# Patient Record
Sex: MALE | Race: WHITE | NOT HISPANIC OR LATINO | Employment: OTHER | URBAN - NONMETROPOLITAN AREA
[De-identification: names, ages, dates, MRNs, and addresses within clinical notes are randomized per-mention and may not be internally consistent; named-entity substitution may affect disease eponyms.]

---

## 2022-03-17 ENCOUNTER — OFFICE VISIT (OUTPATIENT)
Dept: CARDIOLOGY | Facility: CLINIC | Age: 72
End: 2022-03-17

## 2022-03-17 VITALS
WEIGHT: 234 LBS | BODY MASS INDEX: 34.66 KG/M2 | SYSTOLIC BLOOD PRESSURE: 124 MMHG | HEART RATE: 78 BPM | OXYGEN SATURATION: 95 % | DIASTOLIC BLOOD PRESSURE: 76 MMHG | HEIGHT: 69 IN

## 2022-03-17 DIAGNOSIS — R60.0 LOWER EXTREMITY EDEMA: ICD-10-CM

## 2022-03-17 DIAGNOSIS — R07.9 CHEST PAIN, UNSPECIFIED TYPE: Primary | ICD-10-CM

## 2022-03-17 DIAGNOSIS — R06.02 SHORTNESS OF BREATH: ICD-10-CM

## 2022-03-17 PROBLEM — R53.83 FATIGUE: Status: ACTIVE | Noted: 2022-03-17

## 2022-03-17 PROBLEM — N52.9 MALE ERECTILE DISORDER: Status: ACTIVE | Noted: 2022-03-17

## 2022-03-17 PROBLEM — L40.9 PSORIASIS: Status: ACTIVE | Noted: 2022-03-17

## 2022-03-17 PROBLEM — R06.00 DYSPNEA: Status: ACTIVE | Noted: 2022-03-17

## 2022-03-17 PROBLEM — J44.9 CHRONIC OBSTRUCTIVE PULMONARY DISEASE: Status: ACTIVE | Noted: 2022-03-17

## 2022-03-17 PROBLEM — E11.9 TYPE 2 DIABETES MELLITUS WITHOUT COMPLICATION: Status: ACTIVE | Noted: 2022-03-17

## 2022-03-17 PROBLEM — R94.5 ABNORMAL RESULTS OF LIVER FUNCTION STUDIES: Status: ACTIVE | Noted: 2022-03-17

## 2022-03-17 PROBLEM — I10 ESSENTIAL HYPERTENSION: Status: ACTIVE | Noted: 2022-03-17

## 2022-03-17 PROBLEM — Z85.46 HISTORY OF MALIGNANT NEOPLASM OF PROSTATE: Status: ACTIVE | Noted: 2022-03-17

## 2022-03-17 PROCEDURE — 93000 ELECTROCARDIOGRAM COMPLETE: CPT | Performed by: PHYSICIAN ASSISTANT

## 2022-03-17 PROCEDURE — 99204 OFFICE O/P NEW MOD 45 MIN: CPT | Performed by: PHYSICIAN ASSISTANT

## 2022-03-17 RX ORDER — ASPIRIN 81 MG/1
81 TABLET ORAL DAILY
Qty: 30 TABLET | Refills: 5 | Status: SHIPPED | OUTPATIENT
Start: 2022-03-17

## 2022-03-17 RX ORDER — RAMIPRIL 10 MG/1
CAPSULE ORAL
COMMUNITY
Start: 2022-03-07 | End: 2022-03-17

## 2022-03-17 RX ORDER — FUROSEMIDE 40 MG/1
40 TABLET ORAL DAILY
COMMUNITY
Start: 2022-01-20

## 2022-03-17 RX ORDER — PREDNISONE 20 MG/1
TABLET ORAL
COMMUNITY
Start: 2022-02-14 | End: 2022-03-17

## 2022-03-17 RX ORDER — POTASSIUM CHLORIDE 1500 MG/1
20 TABLET, FILM COATED, EXTENDED RELEASE ORAL DAILY
COMMUNITY
Start: 2022-03-10

## 2022-03-17 RX ORDER — LEVALBUTEROL INHALATION SOLUTION 1.25 MG/3ML
SOLUTION RESPIRATORY (INHALATION)
COMMUNITY
Start: 2022-02-21 | End: 2022-08-18 | Stop reason: ALTCHOICE

## 2022-03-17 RX ORDER — NITROGLYCERIN 0.4 MG/1
0.4 TABLET SUBLINGUAL
COMMUNITY
Start: 2022-03-10

## 2022-03-17 RX ORDER — EMPAGLIFLOZIN 10 MG/1
10 TABLET, FILM COATED ORAL DAILY
COMMUNITY
Start: 2022-03-10 | End: 2022-08-18 | Stop reason: DRUGHIGH

## 2022-03-17 RX ORDER — CARVEDILOL 6.25 MG/1
6.25 TABLET ORAL 2 TIMES DAILY WITH MEALS
COMMUNITY
Start: 2022-03-10

## 2022-03-17 NOTE — PROGRESS NOTES
Subjective   Shalom Bravo is a 71 y.o. male     Chief Complaint   Patient presents with   • Cardiac Evaluation     New Patient   Problem list  1.  History of nonobstructive coronaries approximately 8 to 10 years ago per patient report, inadequate data  2.  Chest pain  3.  Lower extremity edema    HPI    Patient is a 71-year-old male who presents to the office to be evaluated.    Recently, patient was at home whenever he started feeling some substernal discomfort.  He felt as if it could be indigestion because he had some belching following that discomfort.  Patient got up and sat down and apparently took his blood pressure and it was significantly elevated.  His heart rate was increased.  Eventually his symptoms improved    He went to see his primary and patient had been experiencing lower extremity edema.  Patient was placed on 40 mg of Lasix and describes losing several pounds.  Patient is actually feeling better since going on that medication    Patient does not describe as severe pain is what he felt that night.  He otherwise has done well.  He has mild dyspnea but can perform activities of daily living.  He does not describe PND orthopnea.     He does not describe any palpitations.  Otherwise is doing well    Current Outpatient Medications   Medication Sig Dispense Refill   • carvedilol (COREG) 6.25 MG tablet Take 6.25 mg by mouth 2 (Two) Times a Day With Meals.     • furosemide (LASIX) 40 MG tablet Take 40 mg by mouth Daily.     • Jardiance 10 MG tablet tablet 10 mg Daily.     • levalbuterol (XOPENEX) 1.25 MG/3ML nebulizer solution      • Multiple Vitamins-Minerals (VITEYES AREDS ADVANCED PO) Take  by mouth.     • nitroglycerin (NITROSTAT) 0.4 MG SL tablet Place 0.4 mg under the tongue Every 5 (Five) Minutes As Needed.     • potassium chloride ER (K-TAB) 20 MEQ tablet controlled-release ER tablet Take 20 mEq by mouth Daily.     • aspirin (aspirin) 81 MG EC tablet Take 1 tablet by mouth Daily. 30 tablet 5  "    No current facility-administered medications for this visit.       Latex    Past Medical History:   Diagnosis Date   • COPD (chronic obstructive pulmonary disease) (HCC)    • Diabetes mellitus (HCC)    • Hypertension    • Prostate cancer (HCC)    • Psoriasis        Social History     Socioeconomic History   • Marital status:    Tobacco Use   • Smoking status: Former Smoker   • Smokeless tobacco: Current User     Types: Snuff   Substance and Sexual Activity   • Alcohol use: Yes   • Drug use: Not Currently   • Sexual activity: Defer       Family History   Problem Relation Age of Onset   • Alzheimer's disease Mother    • Leukemia Father    • Scleroderma Sister    • Heart attack Brother        Review of Systems   Constitutional: Negative.  Negative for chills and fever.   HENT: Negative.  Negative for congestion and sinus pressure.    Respiratory: Positive for shortness of breath. Negative for chest tightness.    Cardiovascular: Positive for chest pain and leg swelling (states improved since starting lasix 40 mg ). Negative for palpitations.   Neurological: Negative.  Negative for dizziness, syncope and light-headedness.   Psychiatric/Behavioral: Positive for sleep disturbance (reports woke several night ago with chest pain, went to pcp next day, did not go to er).       Objective   Vitals:    03/17/22 1127   BP: 124/76   BP Location: Left arm   Patient Position: Sitting   Pulse: 78   SpO2: 95%   Weight: 106 kg (234 lb)   Height: 175.3 cm (69\")      /76 (BP Location: Left arm, Patient Position: Sitting)   Pulse 78   Ht 175.3 cm (69\")   Wt 106 kg (234 lb)   SpO2 95%   BMI 34.56 kg/m²     Lab Results (most recent)     None          Physical Exam  Vitals and nursing note reviewed.   Constitutional:       General: He is not in acute distress.     Appearance: Normal appearance. He is well-developed.   HENT:      Head: Normocephalic and atraumatic.   Eyes:      General: No scleral icterus.        Right " eye: No discharge.         Left eye: No discharge.      Conjunctiva/sclera: Conjunctivae normal.   Neck:      Vascular: No carotid bruit.   Cardiovascular:      Rate and Rhythm: Normal rate and regular rhythm.      Heart sounds: Normal heart sounds. No murmur heard.    No friction rub. No gallop.   Pulmonary:      Effort: Pulmonary effort is normal. No respiratory distress.      Breath sounds: Normal breath sounds. No wheezing or rales.   Chest:      Chest wall: No tenderness.   Musculoskeletal:      Right lower leg: No edema.      Left lower leg: No edema.   Skin:     General: Skin is warm and dry.      Coloration: Skin is not pale.      Findings: No erythema or rash.   Neurological:      Mental Status: He is alert and oriented to person, place, and time.      Cranial Nerves: No cranial nerve deficit.   Psychiatric:         Behavior: Behavior normal.         Procedure     ECG 12 Lead    Date/Time: 3/17/2022 11:37 AM  Performed by: Bello Oneill PA  Authorized by: Bello Oneill PA   Comparison: not compared with previous ECG   Previous ECG: no previous ECG available  Comments: EKG demonstrates sinus rhythm at 74 bpm with no acute ST changes                 Assessment/Plan     Problems Addressed this Visit        Symptoms and Signs    Dyspnea    Relevant Orders    Adult Transthoracic Echo Complete W/ Cont if Necessary Per Protocol    Stress Test With Myocardial Perfusion One Day      Other Visit Diagnoses     Chest pain, unspecified type    -  Primary    Relevant Orders    Adult Transthoracic Echo Complete W/ Cont if Necessary Per Protocol    Stress Test With Myocardial Perfusion One Day    Lower extremity edema        Relevant Orders    Adult Transthoracic Echo Complete W/ Cont if Necessary Per Protocol    Stress Test With Myocardial Perfusion One Day      Diagnoses       Codes Comments    Chest pain, unspecified type    -  Primary ICD-10-CM: R07.9  ICD-9-CM: 786.50     Shortness of breath     ICD-10-CM:  R06.02  ICD-9-CM: 786.05     Lower extremity edema     ICD-10-CM: R60.0  ICD-9-CM: 782.3           Recommendation  1.  Patient is a 71-year-old male that complained of significant chest discomfort that apparently has significant lower extremity edema and has had several pounds of weight loss since starting diuretic therapy.  We are concerned about the possibility of congestive failure and ischemic heart disease.  We will like to evaluate.    2.  Stress test we ordered for an ischemia assessment.    3.  Echo to assess LV systolic and diastolic performance, pulmonary pressures etc.    4.  We are starting him on aspirin daily and he has nitroglycerin available for chest pain as needed.  Any chest pain, not resolved with nitroglycerin, recommend ER evaluation    5.  If symptoms were to worsen from now until the day he does his tests, I recommended him calling the office.  Otherwise we will see him back for follow-up on testing.  Follow-up with primary as scheduled           Patient brought in medicine list to appointment, it's been reviewed with patient and med list was updated in the chart.     Advance Care Planning   ACP discussion was held with the patient during this visit. Patient has an advance directive (not in EMR), copy requested.             Electronically signed by:

## 2022-04-27 ENCOUNTER — HOSPITAL ENCOUNTER (OUTPATIENT)
Dept: CARDIOLOGY | Facility: HOSPITAL | Age: 72
Discharge: HOME | End: 2022-04-27

## 2022-04-27 DIAGNOSIS — R60.0 LOWER EXTREMITY EDEMA: ICD-10-CM

## 2022-04-27 DIAGNOSIS — R07.9 CHEST PAIN, UNSPECIFIED TYPE: ICD-10-CM

## 2022-04-27 DIAGNOSIS — R06.02 SHORTNESS OF BREATH: ICD-10-CM

## 2022-04-27 PROCEDURE — A9500 TC99M SESTAMIBI: HCPCS | Performed by: INTERNAL MEDICINE

## 2022-04-27 PROCEDURE — 93017 CV STRESS TEST TRACING ONLY: CPT

## 2022-04-27 PROCEDURE — 25010000002 REGADENOSON 0.4 MG/5ML SOLUTION: Performed by: INTERNAL MEDICINE

## 2022-04-27 PROCEDURE — 93306 TTE W/DOPPLER COMPLETE: CPT

## 2022-04-27 PROCEDURE — 93018 CV STRESS TEST I&R ONLY: CPT | Performed by: INTERNAL MEDICINE

## 2022-04-27 PROCEDURE — 0 TECHNETIUM SESTAMIBI: Performed by: INTERNAL MEDICINE

## 2022-04-27 PROCEDURE — 78452 HT MUSCLE IMAGE SPECT MULT: CPT

## 2022-04-27 PROCEDURE — 93306 TTE W/DOPPLER COMPLETE: CPT | Performed by: INTERNAL MEDICINE

## 2022-04-27 PROCEDURE — 78452 HT MUSCLE IMAGE SPECT MULT: CPT | Performed by: INTERNAL MEDICINE

## 2022-04-27 RX ADMIN — TECHNETIUM TC 99M SESTAMIBI 1 DOSE: 1 INJECTION INTRAVENOUS at 12:20

## 2022-04-27 RX ADMIN — REGADENOSON 0.4 MG: 0.08 INJECTION, SOLUTION INTRAVENOUS at 14:02

## 2022-04-27 RX ADMIN — TECHNETIUM TC 99M SESTAMIBI 1 DOSE: 1 INJECTION INTRAVENOUS at 14:02

## 2022-04-29 LAB
BH CV REST NUCLEAR ISOTOPE DOSE: 10 MCI
BH CV STRESS COMMENTS STAGE 1: NORMAL
BH CV STRESS DOSE REGADENOSON STAGE 1: 0.4
BH CV STRESS DURATION MIN STAGE 1: 0
BH CV STRESS DURATION SEC STAGE 1: 10
BH CV STRESS NUCLEAR ISOTOPE DOSE: 30 MCI
BH CV STRESS PROTOCOL 1: NORMAL
BH CV STRESS RECOVERY BP: NORMAL MMHG
BH CV STRESS RECOVERY HR: 75 BPM
BH CV STRESS STAGE 1: 1
MAXIMAL PREDICTED HEART RATE: 149 BPM
PERCENT MAX PREDICTED HR: 54.36 %
STRESS BASELINE BP: NORMAL MMHG
STRESS BASELINE HR: 65 BPM
STRESS PERCENT HR: 64 %
STRESS POST PEAK BP: NORMAL MMHG
STRESS POST PEAK HR: 81 BPM
STRESS TARGET HR: 127 BPM

## 2022-05-04 ENCOUNTER — TELEPHONE (OUTPATIENT)
Dept: CARDIOLOGY | Facility: CLINIC | Age: 72
End: 2022-05-04

## 2022-05-04 NOTE — TELEPHONE ENCOUNTER
STRESS  Pt notified of no acute findings. Provider will discuss results at f/u. Pt reminded of appt date and time.    ----- Message from SOMMER Mccallum sent at 5/2/2022  1:11 PM EDT -----  Routine follow-up

## 2022-05-08 LAB
BH CV ECHO MEAS - ACS: 2.06 CM
BH CV ECHO MEAS - AO MAX PG: 3.7 MMHG
BH CV ECHO MEAS - AO MEAN PG: 2.18 MMHG
BH CV ECHO MEAS - AO ROOT DIAM: 3.3 CM
BH CV ECHO MEAS - AO V2 MAX: 96.4 CM/SEC
BH CV ECHO MEAS - AO V2 VTI: 20.1 CM
BH CV ECHO MEAS - EDV(CUBED): 103.2 ML
BH CV ECHO MEAS - EDV(MOD-SP4): 86.8 ML
BH CV ECHO MEAS - EF(MOD-SP4): 50.6 %
BH CV ECHO MEAS - ESV(CUBED): 30.4 ML
BH CV ECHO MEAS - ESV(MOD-SP4): 42.9 ML
BH CV ECHO MEAS - FS: 33.5 %
BH CV ECHO MEAS - IVS/LVPW: 0.93 CM
BH CV ECHO MEAS - IVSD: 1.32 CM
BH CV ECHO MEAS - LA DIMENSION: 3.2 CM
BH CV ECHO MEAS - LAT PEAK E' VEL: 7.6 CM/SEC
BH CV ECHO MEAS - LV DIASTOLIC VOL/BSA (35-75): 39.3 CM2
BH CV ECHO MEAS - LV MASS(C)D: 256 GRAMS
BH CV ECHO MEAS - LV SYSTOLIC VOL/BSA (12-30): 19.4 CM2
BH CV ECHO MEAS - LVIDD: 4.7 CM
BH CV ECHO MEAS - LVIDS: 3.1 CM
BH CV ECHO MEAS - LVOT AREA: 4.2 CM2
BH CV ECHO MEAS - LVOT DIAM: 2.32 CM
BH CV ECHO MEAS - LVPWD: 1.42 CM
BH CV ECHO MEAS - MED PEAK E' VEL: 7 CM/SEC
BH CV ECHO MEAS - MV A MAX VEL: 83.1 CM/SEC
BH CV ECHO MEAS - MV DEC SLOPE: 277.7 CM/SEC2
BH CV ECHO MEAS - MV E MAX VEL: 75.8 CM/SEC
BH CV ECHO MEAS - MV E/A: 0.91
BH CV ECHO MEAS - RVDD: 2.43 CM
BH CV ECHO MEAS - SI(MOD-SP4): 19.9 ML/M2
BH CV ECHO MEAS - SV(MOD-SP4): 43.9 ML
BH CV ECHO MEASUREMENTS AVERAGE E/E' RATIO: 10.38
LEFT ATRIUM VOLUME INDEX: 16 ML/M2
MAXIMAL PREDICTED HEART RATE: 149 BPM
STRESS TARGET HR: 127 BPM

## 2022-05-10 ENCOUNTER — TELEPHONE (OUTPATIENT)
Dept: CARDIOLOGY | Facility: CLINIC | Age: 72
End: 2022-05-10

## 2022-05-10 NOTE — TELEPHONE ENCOUNTER
ECHO  Pt notified of no acute findings. Provider will discuss results at f/u. Pt reminded of appt date and time.  ----- Message from SOMMER Mccallum sent at 5/9/2022  8:54 AM EDT -----  Routine follow-up

## 2022-08-18 ENCOUNTER — OFFICE VISIT (OUTPATIENT)
Dept: CARDIOLOGY | Facility: CLINIC | Age: 72
End: 2022-08-18

## 2022-08-18 VITALS
WEIGHT: 231 LBS | OXYGEN SATURATION: 98 % | HEIGHT: 69 IN | HEART RATE: 76 BPM | DIASTOLIC BLOOD PRESSURE: 76 MMHG | BODY MASS INDEX: 34.21 KG/M2 | SYSTOLIC BLOOD PRESSURE: 150 MMHG

## 2022-08-18 DIAGNOSIS — I10 PRIMARY HYPERTENSION: ICD-10-CM

## 2022-08-18 DIAGNOSIS — R60.0 LOWER EXTREMITY EDEMA: Primary | ICD-10-CM

## 2022-08-18 DIAGNOSIS — R06.02 SHORTNESS OF BREATH: ICD-10-CM

## 2022-08-18 PROCEDURE — 99213 OFFICE O/P EST LOW 20 MIN: CPT | Performed by: PHYSICIAN ASSISTANT

## 2022-08-18 RX ORDER — RAMIPRIL 10 MG/1
10 CAPSULE ORAL DAILY
COMMUNITY

## 2022-08-18 RX ORDER — ALBUTEROL SULFATE 90 UG/1
2 AEROSOL, METERED RESPIRATORY (INHALATION) EVERY 4 HOURS PRN
COMMUNITY

## 2022-08-18 NOTE — PROGRESS NOTES
Problem list     Subjective   Shalom Bravo is a 71 y.o. male     Chief Complaint   Patient presents with   • Follow-up   • Hypertension   Problem list  1.  History of nonobstructive coronaries approximately 8 to 10 years ago per patient report, inadequate data  1.1 stress test April 2021 demonstrating no evidence of ischemia preserved LV function  2.  Chest pain  3.  Lower extremity edema       HPI    Patient is a 71-year-old male who presents to the office for evaluation.    Patient has done remarkably well.  Since his last visit, he has no chest pain or pressure.  He can experience mild dyspnea but that is chronic.  He does not describe progressive dyspnea.  He does not describe PND orthopnea.    He occasionally may palpitate.  He does not describe this happening daily.  This happens on occasion.  He does not describe any dizziness, presyncope or syncope.  He does not describe any symptoms of cerebral embolic events.      In the past, Lasix was discontinued and he started having significant edema but back on Lasix therapy, he is doing much better.  He is feeling well    We reviewed testing.  Echo suggested good LV function with mild diastolic dysfunction.  No significant valve disease.  No effusion        Current Outpatient Medications on File Prior to Visit   Medication Sig Dispense Refill   • albuterol sulfate  (90 Base) MCG/ACT inhaler Inhale 2 puffs Every 4 (Four) Hours As Needed for Wheezing.     • aspirin (aspirin) 81 MG EC tablet Take 1 tablet by mouth Daily. 30 tablet 5   • carvedilol (COREG) 6.25 MG tablet Take 6.25 mg by mouth 2 (Two) Times a Day With Meals.     • furosemide (LASIX) 40 MG tablet Take 40 mg by mouth Daily.     • Multiple Vitamins-Minerals (VITEYES AREDS ADVANCED PO) Take  by mouth.     • nitroglycerin (NITROSTAT) 0.4 MG SL tablet Place 0.4 mg under the tongue Every 5 (Five) Minutes As Needed.     • potassium chloride ER (K-TAB) 20 MEQ tablet controlled-release ER tablet Take 20  "mEq by mouth Daily.     • ramipril (ALTACE) 10 MG capsule Take 10 mg by mouth Daily.     • [DISCONTINUED] Jardiance 10 MG tablet tablet 10 mg Daily.     • [DISCONTINUED] levalbuterol (XOPENEX) 1.25 MG/3ML nebulizer solution        No current facility-administered medications on file prior to visit.       Latex    Past Medical History:   Diagnosis Date   • COPD (chronic obstructive pulmonary disease) (HCC)    • Diabetes mellitus (HCC)    • Hypertension    • Prostate cancer (HCC)    • Psoriasis        Social History     Socioeconomic History   • Marital status:    Tobacco Use   • Smoking status: Former Smoker   • Smokeless tobacco: Current User     Types: Snuff   Substance and Sexual Activity   • Alcohol use: Yes   • Drug use: Not Currently   • Sexual activity: Defer       Family History   Problem Relation Age of Onset   • Alzheimer's disease Mother    • Leukemia Father    • Scleroderma Sister    • Heart attack Brother        Review of Systems   Constitutional: Negative.  Negative for chills and fever.   HENT: Negative.  Negative for congestion and sinus pressure.    Respiratory: Positive for shortness of breath. Negative for chest tightness.    Cardiovascular: Positive for palpitations (occasionally races). Negative for chest pain and leg swelling.   Gastrointestinal: Negative.  Negative for abdominal pain, blood in stool, constipation, diarrhea, nausea and vomiting.   Endocrine: Negative.  Negative for cold intolerance and heat intolerance.   Genitourinary: Negative.  Negative for dysuria, frequency, hematuria and urgency.   Neurological: Negative.  Negative for dizziness, syncope and light-headedness.   Psychiatric/Behavioral: Negative.  Negative for sleep disturbance (denies waking with soa or cp).       Objective   Vitals:    08/18/22 1109   BP: 150/76   BP Location: Left arm   Patient Position: Sitting   Pulse: 76   SpO2: 98%   Weight: 105 kg (231 lb)   Height: 175.3 cm (69\")      /76 (BP Location: " "Left arm, Patient Position: Sitting)   Pulse 76   Ht 175.3 cm (69\")   Wt 105 kg (231 lb)   SpO2 98%   BMI 34.11 kg/m²     Lab Results (most recent)     None          Physical Exam  Vitals and nursing note reviewed.   Constitutional:       General: He is not in acute distress.     Appearance: Normal appearance. He is well-developed.   HENT:      Head: Normocephalic and atraumatic.   Eyes:      General: No scleral icterus.        Right eye: No discharge.         Left eye: No discharge.      Conjunctiva/sclera: Conjunctivae normal.   Neck:      Vascular: No carotid bruit.   Cardiovascular:      Rate and Rhythm: Normal rate and regular rhythm.      Heart sounds: Normal heart sounds. No murmur heard.    No friction rub. No gallop.   Pulmonary:      Effort: Pulmonary effort is normal. No respiratory distress.      Breath sounds: Normal breath sounds. No wheezing or rales.   Chest:      Chest wall: No tenderness.   Musculoskeletal:      Right lower leg: No edema.      Left lower leg: No edema.   Skin:     General: Skin is warm and dry.      Coloration: Skin is not pale.      Findings: No erythema or rash.   Neurological:      Mental Status: He is alert and oriented to person, place, and time.      Cranial Nerves: No cranial nerve deficit.   Psychiatric:         Behavior: Behavior normal.         Procedure   Procedures       Assessment & Plan     Problems Addressed this Visit        Cardiac and Vasculature    Primary hypertension    Relevant Medications    ramipril (ALTACE) 10 MG capsule       Symptoms and Signs    Dyspnea    Lower extremity edema - Primary      Diagnoses       Codes Comments    Lower extremity edema    -  Primary ICD-10-CM: R60.0  ICD-9-CM: 782.3     Shortness of breath     ICD-10-CM: R06.02  ICD-9-CM: 786.05     Primary hypertension     ICD-10-CM: I10  ICD-9-CM: 401.9         Recommendation  1.  Patient is a 71-year-old male who presents back for follow-up doing remarkably well.  He has no chest " pain.  Dyspnea is mild at this point.  He has no ischemia by stress testing and preserved LV function.  For now, we will monitor    2.  Patient with lower extremity edema doing well on Lasix therapy.  He may have a degree of venous insufficiency.  For now, we will monitor    3.  Patient with baseline hypertension slightly elevated.  He may have a degree of whitecoat hypertension.  It was better last visit.  We will monitor for now    4.  We can see patient back for follow-up in a year or sooner as symptoms discussed.  Follow-up with primary as scheduled             Shalom Bravo  reports that he has quit smoking. His smokeless tobacco use includes snuff.. I have educated him on the risk of diseases from using tobacco products such as cancer, COPD and heart disease.     I advised him to quit and he is not willing to quit.    I spent 3  minutes counseling the patient.          Patient did not bring med list or medicine bottles to appointment, med list has been reviewed and updated based on patient's knowledge of their meds.     Advance Care Planning   ACP discussion was held with the patient during this visit. Patient has an advance directive (not in EMR), copy requested.            Electronically signed by:

## 2023-01-04 ENCOUNTER — TELEPHONE (OUTPATIENT)
Dept: CARDIOLOGY | Facility: CLINIC | Age: 73
End: 2023-01-04
Payer: MEDICARE

## 2023-01-04 NOTE — TELEPHONE ENCOUNTER
Caller: MARCELLO    Relationship: DR. [facility] OFFICE    What form or medical record are you requesting: MOST RECENT CONSULT NOTE    How would you like to receive the form or medical records (pick-up, mail, fax): FAX  If fax, what is the fax number: [phone]    Timeframe paperwork needed: ASAP

## 2023-08-14 ENCOUNTER — TELEPHONE (OUTPATIENT)
Dept: CARDIOLOGY | Facility: CLINIC | Age: 73
End: 2023-08-14

## 2023-08-14 NOTE — TELEPHONE ENCOUNTER
Caller: Shalom Bravo    Relationship to patient: Self    Best call back number: [phone]    Chief complaint: HAD TO RESCHEDULE APPT.    Type of visit: YEARLY FU    Requested date: MONTH OR TWO OUT     If rescheduling, when is the original appointment: 08.21.23     Additional notes: DAVID HAD NOTHING TILL MARCH 2024

## 2023-11-27 ENCOUNTER — OFFICE VISIT (OUTPATIENT)
Dept: CARDIOLOGY | Facility: CLINIC | Age: 73
End: 2023-11-27
Payer: MEDICARE

## 2023-11-27 VITALS
WEIGHT: 236 LBS | HEART RATE: 101 BPM | DIASTOLIC BLOOD PRESSURE: 78 MMHG | BODY MASS INDEX: 34.85 KG/M2 | OXYGEN SATURATION: 96 % | SYSTOLIC BLOOD PRESSURE: 152 MMHG

## 2023-11-27 DIAGNOSIS — R06.02 SHORTNESS OF BREATH: ICD-10-CM

## 2023-11-27 DIAGNOSIS — R60.0 LOWER EXTREMITY EDEMA: Primary | ICD-10-CM

## 2023-11-27 DIAGNOSIS — I10 PRIMARY HYPERTENSION: ICD-10-CM

## 2023-11-27 PROBLEM — R07.9 CHEST PAIN: Status: ACTIVE | Noted: 2023-11-27

## 2023-11-27 PROCEDURE — 3077F SYST BP >= 140 MM HG: CPT | Performed by: PHYSICIAN ASSISTANT

## 2023-11-27 PROCEDURE — 99213 OFFICE O/P EST LOW 20 MIN: CPT | Performed by: PHYSICIAN ASSISTANT

## 2023-11-27 PROCEDURE — 3078F DIAST BP <80 MM HG: CPT | Performed by: PHYSICIAN ASSISTANT

## 2023-11-27 NOTE — PROGRESS NOTES
Problem list     Subjective   Shalom Bravo is a 72 y.o. male     Chief Complaint   Patient presents with    Follow-up     YEARLY     Problem list  1.  History of nonobstructive coronaries approximately 8 to 10 years ago per patient report, inadequate data  1.1 stress test April 2022 demonstrating no evidence of ischemia preserved LV function  2.  Chest pain  3.  Lower extremity edema  HPI    Patient is a 72-year-old male brought to the office for routine cardiac follow-up.  He has underwent systolic function.    He has done well without any complaints of chest pain or pressure.  He apparently has a degree of dyspnea but that has been relatively stable.  Does not describe progressive shortness of breath.  No complaints of PND or orthopnea.  He has chronic lower extremity edema and is on chronic Lasix therapy.    He does not describe palpitating nor does he complain of any dysrhythmic symptoms.  He is stable otherwise.      Current Outpatient Medications on File Prior to Visit   Medication Sig Dispense Refill    albuterol sulfate  (90 Base) MCG/ACT inhaler Inhale 2 puffs Every 4 (Four) Hours As Needed for Wheezing.      aspirin (aspirin) 81 MG EC tablet Take 1 tablet by mouth Daily. 30 tablet 5    carvedilol (COREG) 6.25 MG tablet Take 1 tablet by mouth 2 (Two) Times a Day With Meals.      furosemide (LASIX) 40 MG tablet Take 1 tablet by mouth Daily.      Multiple Vitamins-Minerals (VITEYES AREDS ADVANCED PO) Take  by mouth.      nitroglycerin (NITROSTAT) 0.4 MG SL tablet Place 1 tablet under the tongue Every 5 (Five) Minutes As Needed.      potassium chloride ER (K-TAB) 20 MEQ tablet controlled-release ER tablet Take 1 tablet by mouth Daily.      ramipril (ALTACE) 10 MG capsule Take 1 capsule by mouth Daily.       No current facility-administered medications on file prior to visit.       Latex    Past Medical History:   Diagnosis Date    COPD (chronic obstructive pulmonary disease)     Diabetes mellitus      Hypertension     Prostate cancer     Psoriasis        Social History     Socioeconomic History    Marital status:    Tobacco Use    Smoking status: Former    Smokeless tobacco: Current     Types: Snuff   Substance and Sexual Activity    Alcohol use: Yes    Drug use: Not Currently    Sexual activity: Defer       Family History   Problem Relation Age of Onset    Alzheimer's disease Mother     Leukemia Father     Scleroderma Sister     Heart attack Brother        Review of Systems   Constitutional: Negative.    HENT: Negative.     Eyes: Negative.    Respiratory:  Positive for shortness of breath and wheezing. Negative for apnea, cough and chest tightness.    Cardiovascular: Negative.  Negative for chest pain, palpitations and leg swelling.   Gastrointestinal:  Positive for blood in stool.   Endocrine: Negative.    Genitourinary: Negative.  Negative for hematuria.   Skin: Negative.  Negative for color change, rash and wound.   Allergic/Immunologic: Negative.    Neurological:  Negative for dizziness, light-headedness and headaches.   Hematological:  Bruises/bleeds easily (BRUISES).   Psychiatric/Behavioral:  Negative for sleep disturbance.        Objective   Vitals:    11/27/23 0850   BP: 152/78   Pulse: 101   SpO2: 96%   Weight: 107 kg (236 lb)      /78   Pulse 101   Wt 107 kg (236 lb)   SpO2 96%   BMI 34.85 kg/m²     Lab Results (most recent)       None            Physical Exam  Vitals and nursing note reviewed.   Constitutional:       General: He is not in acute distress.     Appearance: Normal appearance. He is well-developed.   HENT:      Head: Normocephalic and atraumatic.   Eyes:      General: No scleral icterus.        Right eye: No discharge.         Left eye: No discharge.      Conjunctiva/sclera: Conjunctivae normal.   Neck:      Vascular: No carotid bruit.   Cardiovascular:      Rate and Rhythm: Normal rate and regular rhythm.      Heart sounds: Normal heart sounds. No murmur heard.     No  friction rub. No gallop.   Pulmonary:      Effort: Pulmonary effort is normal. No respiratory distress.      Breath sounds: Normal breath sounds. No wheezing or rales.   Chest:      Chest wall: No tenderness.   Skin:     General: Skin is warm and dry.      Coloration: Skin is not pale.      Findings: No erythema or rash.   Neurological:      Mental Status: He is alert and oriented to person, place, and time.      Cranial Nerves: No cranial nerve deficit.   Psychiatric:         Behavior: Behavior normal.         Procedure   Procedures       Assessment & Plan     Problems Addressed this Visit          Cardiac and Vasculature    Primary hypertension       Symptoms and Signs    Dyspnea    Lower extremity edema - Primary     Diagnoses         Codes Comments    Lower extremity edema    -  Primary ICD-10-CM: R60.0  ICD-9-CM: 782.3     Primary hypertension     ICD-10-CM: I10  ICD-9-CM: 401.9     Shortness of breath     ICD-10-CM: R06.02  ICD-9-CM: 786.05           Recommendation  1.  Patient is a 72-year-old male who presents back to the office for routine assessment.  Patient has felt well with a degree of dyspnea at baseline but he is seeing pulmonology tomorrow.  WE will await their findings.  Otherwise, he is stable with recent testing being largely benign from a cardiac standpoint.  We will monitor for now.    2.  Patient with lower extremity edema that is manageable.  Patient on chronic diuretic therapy we will monitor.    3.  Patient with baseline hypertension.  His blood pressure is manageable at this point.  It is apparently much better at home.  We will monitor for now.    4.  We will see patient back for follow-up in 1 year or sooner as discussed.  Follow-up with primary as scheduled.           Patient did not bring med list or medicine bottles to appointment, med list has been reviewed and updated based on patient's knowledge of their meds.      Advance Care Planning   ACP discussion was declined by the patient.  Patient has an advance directive (not in EMR), copy requested.      Electronically signed by:

## 2024-11-27 ENCOUNTER — OFFICE VISIT (OUTPATIENT)
Dept: CARDIOLOGY | Facility: CLINIC | Age: 74
End: 2024-11-27
Payer: MEDICARE

## 2024-11-27 VITALS
OXYGEN SATURATION: 91 % | WEIGHT: 247 LBS | HEART RATE: 89 BPM | DIASTOLIC BLOOD PRESSURE: 85 MMHG | HEIGHT: 70 IN | SYSTOLIC BLOOD PRESSURE: 142 MMHG | BODY MASS INDEX: 35.36 KG/M2

## 2024-11-27 DIAGNOSIS — R06.02 SHORTNESS OF BREATH: ICD-10-CM

## 2024-11-27 DIAGNOSIS — I10 PRIMARY HYPERTENSION: ICD-10-CM

## 2024-11-27 DIAGNOSIS — R60.0 LOWER EXTREMITY EDEMA: Primary | ICD-10-CM

## 2024-11-27 PROCEDURE — 93000 ELECTROCARDIOGRAM COMPLETE: CPT | Performed by: PHYSICIAN ASSISTANT

## 2024-11-27 PROCEDURE — 3079F DIAST BP 80-89 MM HG: CPT | Performed by: PHYSICIAN ASSISTANT

## 2024-11-27 PROCEDURE — 99214 OFFICE O/P EST MOD 30 MIN: CPT | Performed by: PHYSICIAN ASSISTANT

## 2024-11-27 PROCEDURE — 3077F SYST BP >= 140 MM HG: CPT | Performed by: PHYSICIAN ASSISTANT

## 2024-11-27 RX ORDER — GUSELKUMAB 100 MG/ML
INJECTION SUBCUTANEOUS
COMMUNITY

## 2024-11-27 NOTE — PROGRESS NOTES
Problem list     Subjective   Shalom Bravo is a 73 y.o. male     Chief Complaint   Patient presents with    Follow-up    Chest Pain     At night    Shortness of Breath   Problem list  1.  History of nonobstructive coronaries approximately 8 to 10 years ago per patient report, inadequate data  1.1 stress test April 2022 demonstrating no evidence of ischemia preserved LV function  2.  Chest pain  3.  Lower extremity edema  4.  Diastolic dysfunction  5.  Pulmonary fibrosis    HPI    Patient is a 73-year-old male presenting back to the office for routine cardiac assessment yearly evaluation.  He has history of nonobstructive coronaries in the past.  He has history of negative testing in 2022 in regards to ischemia from stress test and normal systolic function but diastolic dysfunction noted.    Patient has underlying lung disease and complains of dyspnea when trying to exert but has complained of orthopnea.  This has been ongoing for several weeks if not a few months.  He has had significant edema but has been on Lasix therapy with refractory edema.  He does not experience any chest pain or pressure.    He occasionally mycins a flutter type sensation.  He does not describe any dizziness, presyncope or syncope.  He does not describe any strokelike symptoms.  Overall, patient has done well.      Current Outpatient Medications on File Prior to Visit   Medication Sig Dispense Refill    albuterol sulfate  (90 Base) MCG/ACT inhaler Inhale 2 puffs Every 4 (Four) Hours As Needed for Wheezing.      carvedilol (COREG) 3.125 MG tablet Take 1 tablet by mouth 2 (Two) Times a Day With Meals.      furosemide (LASIX) 40 MG tablet Take 1 tablet by mouth Daily.      Guselkumab (Tremfya) 100 MG/ML solution auto-injector       metFORMIN (GLUCOPHAGE) 500 MG tablet 1 tablet 2 (Two) Times a Day With Meals.      Multiple Vitamins-Minerals (VITEYES AREDS ADVANCED PO) Take  by mouth.      nitroglycerin (NITROSTAT) 0.4 MG SL tablet  Place 1 tablet under the tongue Every 5 (Five) Minutes As Needed.      potassium chloride ER (K-TAB) 20 MEQ tablet controlled-release ER tablet Take 1 tablet by mouth Daily.      [DISCONTINUED] ramipril (ALTACE) 10 MG capsule Take 1 capsule by mouth Daily.      [DISCONTINUED] aspirin (aspirin) 81 MG EC tablet Take 1 tablet by mouth Daily. 30 tablet 5     No current facility-administered medications on file prior to visit.       Latex    Past Medical History:   Diagnosis Date    COPD (chronic obstructive pulmonary disease)     Diabetes mellitus     Hypertension     Prostate cancer     Psoriasis        Social History     Socioeconomic History    Marital status:    Tobacco Use    Smoking status: Former    Smokeless tobacco: Current     Types: Snuff   Substance and Sexual Activity    Alcohol use: Yes    Drug use: Not Currently    Sexual activity: Defer       Family History   Problem Relation Age of Onset    Alzheimer's disease Mother     Leukemia Father     Scleroderma Sister     Heart attack Brother        Review of Systems   Constitutional:  Negative for activity change, appetite change, chills, fatigue and fever.   HENT: Negative.  Negative for congestion, sinus pressure and sinus pain.    Eyes: Negative.  Negative for visual disturbance.   Respiratory:  Positive for shortness of breath. Negative for apnea.    Cardiovascular:  Negative for chest pain, palpitations and leg swelling.   Gastrointestinal: Negative.  Negative for blood in stool.   Endocrine: Negative.  Negative for cold intolerance and heat intolerance.   Genitourinary: Negative.  Negative for hematuria.   Musculoskeletal: Negative.  Negative for gait problem.   Skin: Negative.  Negative for color change, rash and wound.   Allergic/Immunologic: Negative.  Negative for environmental allergies and food allergies.   Neurological:  Negative for dizziness, syncope, weakness, light-headedness, numbness and headaches.   Hematological: Negative.  Does not  "bruise/bleed easily.   Psychiatric/Behavioral: Negative.  Negative for sleep disturbance.        Objective   Vitals:    11/27/24 0856   BP: 142/85   BP Location: Right arm   Patient Position: Sitting   Cuff Size: Adult   Pulse: 89   SpO2: 91%   Weight: 112 kg (247 lb)   Height: 177.8 cm (70\")      /85 (BP Location: Right arm, Patient Position: Sitting, Cuff Size: Adult)   Pulse 89   Ht 177.8 cm (70\")   Wt 112 kg (247 lb)   SpO2 91%   BMI 35.44 kg/m²     Lab Results (most recent)       None            Physical Exam  Vitals and nursing note reviewed.   Constitutional:       General: He is not in acute distress.     Appearance: Normal appearance. He is well-developed.   HENT:      Head: Normocephalic and atraumatic.   Eyes:      General: No scleral icterus.        Right eye: No discharge.         Left eye: No discharge.      Conjunctiva/sclera: Conjunctivae normal.   Neck:      Vascular: No carotid bruit.   Cardiovascular:      Rate and Rhythm: Normal rate and regular rhythm.      Heart sounds: Normal heart sounds. No murmur heard.     No friction rub. No gallop.   Pulmonary:      Effort: Pulmonary effort is normal. No respiratory distress.      Breath sounds: Normal breath sounds. No wheezing or rales.   Chest:      Chest wall: No tenderness.   Musculoskeletal:      Right lower leg: Edema present.      Left lower leg: Edema present.   Skin:     General: Skin is warm and dry.      Coloration: Skin is not pale.      Findings: No erythema or rash.   Neurological:      Mental Status: He is alert and oriented to person, place, and time.      Cranial Nerves: No cranial nerve deficit.   Psychiatric:         Behavior: Behavior normal.         Procedure     ECG 12 Lead    Date/Time: 11/27/2024 8:57 AM  Performed by: Bello Oneill PA    Authorized by: Bello Oneill PA  Comparison: compared with previous ECG from 3/17/2022  Comparison to previous ECG: EKG demonstrates sinus rhythm at 84 bpm with right bundle " branch block and no acute ST changes             Assessment & Plan     Problems Addressed this Visit          Cardiac and Vasculature    Primary hypertension    Relevant Orders    Adult Transthoracic Echo Complete W/ Cont if Necessary Per Protocol       Symptoms and Signs    Dyspnea    Relevant Orders    Adult Transthoracic Echo Complete W/ Cont if Necessary Per Protocol    Lower extremity edema - Primary    Relevant Orders    Adult Transthoracic Echo Complete W/ Cont if Necessary Per Protocol     Diagnoses         Codes Comments    Lower extremity edema    -  Primary ICD-10-CM: R60.0  ICD-9-CM: 782.3     Primary hypertension     ICD-10-CM: I10  ICD-9-CM: 401.9     Shortness of breath     ICD-10-CM: R06.02  ICD-9-CM: 786.05           Recommendations  1.  Patient is a 73-year-old male with history of diastolic dysfunction and has significant lower extremity edema and complains of orthopnea.  He does have underlying lung disease but there is some concern that he is having potential heart failure.  I am concerned that he potentially has a degree of diastolic heart failure and is on moderate dose of Lasix with continued edema.  I want to repeat an echocardiogram to evaluate.  We want to assess LV systolic and diastolic performance as well as filling pressures and pulmonary pressures.    2.  Empirically, I would like to stop ramipril and transition to Entresto.  I want him to call back in 1 week to see if he has any improvement of symptoms.  If in fact that is the case, more convinced that this is diastolic heart failure.  We want to attempt to try this medication to see if he has clinical improvement.    3.  Patient's blood pressure slightly elevated but we are making adjustments to his regimen.  We will evaluate response.    4.  He is to hold ramipril today.  He has not taken yet and was instructed to take Entresto tomorrow.    5.  We will see him back for follow-up after echo.  He is not interested in stress testing  as discussed with him today.  If symptoms worsen, want him to call the office.  Follow-up with primary as scheduled.         Patient did not bring med list or medicine bottles to appointment, med list has been reviewed and updated based on patient's knowledge of their meds.      Advance Care Planning   ACP discussion was declined by the patient. Patient has an advance directive (not in EMR), copy requested.      Electronically signed by:

## 2024-11-27 NOTE — LETTER
November 27, 2024     Giacomo Hays PA-C  [address] [place] [address] [place] [address]    Patient: Shalom Bravo   YOB: 1950   Date of Visit: 11/27/2024       Dear Giacomo Hays PA-C    Shalom Bravo was in my office today. Below is a copy of my note.    If you have questions, please do not hesitate to call me. I look forward to following Shalom along with you.         Sincerely,        SOMMER You        CC: No Recipients    Problem list     Subjective  Shalom Bravo is a 73 y.o. male     Chief Complaint   Patient presents with   • Follow-up   • Chest Pain     At night   • Shortness of Breath   Problem list  1.  History of nonobstructive coronaries approximately 8 to 10 years ago per patient report, inadequate data  1.1 stress test April 2022 demonstrating no evidence of ischemia preserved LV function  2.  Chest pain  3.  Lower extremity edema  4.  Diastolic dysfunction  5.  Pulmonary fibrosis    HPI    Patient is a 73-year-old male presenting back to the office for routine cardiac assessment yearly evaluation.  He has history of nonobstructive coronaries in the past.  He has history of negative testing in 2022 in regards to ischemia from stress test and normal systolic function but diastolic dysfunction noted.    Patient has underlying lung disease and complains of dyspnea when trying to exert but has complained of orthopnea.  This has been ongoing for several weeks if not a few months.  He has had significant edema but has been on Lasix therapy with refractory edema.  He does not experience any chest pain or pressure.    He occasionally mycins a flutter type sensation.  He does not describe any dizziness, presyncope or syncope.  He does not describe any strokelike symptoms.  Overall, patient has done well.      Current Outpatient Medications on File Prior to Visit   Medication Sig Dispense Refill   • albuterol sulfate  (90 Base) MCG/ACT inhaler Inhale 2 puffs Every 4 (Four) Hours As  Needed for Wheezing.     • carvedilol (COREG) 3.125 MG tablet Take 1 tablet by mouth 2 (Two) Times a Day With Meals.     • furosemide (LASIX) 40 MG tablet Take 1 tablet by mouth Daily.     • Guselkumab (Tremfya) 100 MG/ML solution auto-injector      • metFORMIN (GLUCOPHAGE) 500 MG tablet 1 tablet 2 (Two) Times a Day With Meals.     • Multiple Vitamins-Minerals (VITEYES AREDS ADVANCED PO) Take  by mouth.     • nitroglycerin (NITROSTAT) 0.4 MG SL tablet Place 1 tablet under the tongue Every 5 (Five) Minutes As Needed.     • potassium chloride ER (K-TAB) 20 MEQ tablet controlled-release ER tablet Take 1 tablet by mouth Daily.     • [DISCONTINUED] ramipril (ALTACE) 10 MG capsule Take 1 capsule by mouth Daily.     • [DISCONTINUED] aspirin (aspirin) 81 MG EC tablet Take 1 tablet by mouth Daily. 30 tablet 5     No current facility-administered medications on file prior to visit.       Latex    Past Medical History:   Diagnosis Date   • COPD (chronic obstructive pulmonary disease)    • Diabetes mellitus    • Hypertension    • Prostate cancer    • Psoriasis        Social History     Socioeconomic History   • Marital status:    Tobacco Use   • Smoking status: Former   • Smokeless tobacco: Current     Types: Snuff   Substance and Sexual Activity   • Alcohol use: Yes   • Drug use: Not Currently   • Sexual activity: Defer       Family History   Problem Relation Age of Onset   • Alzheimer's disease Mother    • Leukemia Father    • Scleroderma Sister    • Heart attack Brother        Review of Systems   Constitutional:  Negative for activity change, appetite change, chills, fatigue and fever.   HENT: Negative.  Negative for congestion, sinus pressure and sinus pain.    Eyes: Negative.  Negative for visual disturbance.   Respiratory:  Positive for shortness of breath. Negative for apnea.    Cardiovascular:  Negative for chest pain, palpitations and leg swelling.   Gastrointestinal: Negative.  Negative for blood in stool.  "  Endocrine: Negative.  Negative for cold intolerance and heat intolerance.   Genitourinary: Negative.  Negative for hematuria.   Musculoskeletal: Negative.  Negative for gait problem.   Skin: Negative.  Negative for color change, rash and wound.   Allergic/Immunologic: Negative.  Negative for environmental allergies and food allergies.   Neurological:  Negative for dizziness, syncope, weakness, light-headedness, numbness and headaches.   Hematological: Negative.  Does not bruise/bleed easily.   Psychiatric/Behavioral: Negative.  Negative for sleep disturbance.        Objective  Vitals:    11/27/24 0856   BP: 142/85   BP Location: Right arm   Patient Position: Sitting   Cuff Size: Adult   Pulse: 89   SpO2: 91%   Weight: 112 kg (247 lb)   Height: 177.8 cm (70\")      /85 (BP Location: Right arm, Patient Position: Sitting, Cuff Size: Adult)   Pulse 89   Ht 177.8 cm (70\")   Wt 112 kg (247 lb)   SpO2 91%   BMI 35.44 kg/m²     Lab Results (most recent)       None            Physical Exam  Vitals and nursing note reviewed.   Constitutional:       General: He is not in acute distress.     Appearance: Normal appearance. He is well-developed.   HENT:      Head: Normocephalic and atraumatic.   Eyes:      General: No scleral icterus.        Right eye: No discharge.         Left eye: No discharge.      Conjunctiva/sclera: Conjunctivae normal.   Neck:      Vascular: No carotid bruit.   Cardiovascular:      Rate and Rhythm: Normal rate and regular rhythm.      Heart sounds: Normal heart sounds. No murmur heard.     No friction rub. No gallop.   Pulmonary:      Effort: Pulmonary effort is normal. No respiratory distress.      Breath sounds: Normal breath sounds. No wheezing or rales.   Chest:      Chest wall: No tenderness.   Musculoskeletal:      Right lower leg: Edema present.      Left lower leg: Edema present.   Skin:     General: Skin is warm and dry.      Coloration: Skin is not pale.      Findings: No erythema or " rash.   Neurological:      Mental Status: He is alert and oriented to person, place, and time.      Cranial Nerves: No cranial nerve deficit.   Psychiatric:         Behavior: Behavior normal.         Procedure    ECG 12 Lead    Date/Time: 11/27/2024 8:57 AM  Performed by: Bello Oneill PA    Authorized by: Bello Oneill PA  Comparison: compared with previous ECG from 3/17/2022  Comparison to previous ECG: EKG demonstrates sinus rhythm at 84 bpm with right bundle branch block and no acute ST changes             Assessment & Plan    Problems Addressed this Visit          Cardiac and Vasculature    Primary hypertension    Relevant Orders    Adult Transthoracic Echo Complete W/ Cont if Necessary Per Protocol       Symptoms and Signs    Dyspnea    Relevant Orders    Adult Transthoracic Echo Complete W/ Cont if Necessary Per Protocol    Lower extremity edema - Primary    Relevant Orders    Adult Transthoracic Echo Complete W/ Cont if Necessary Per Protocol     Diagnoses         Codes Comments    Lower extremity edema    -  Primary ICD-10-CM: R60.0  ICD-9-CM: 782.3     Primary hypertension     ICD-10-CM: I10  ICD-9-CM: 401.9     Shortness of breath     ICD-10-CM: R06.02  ICD-9-CM: 786.05           Recommendations  1.  Patient is a 73-year-old male with history of diastolic dysfunction and has significant lower extremity edema and complains of orthopnea.  He does have underlying lung disease but there is some concern that he is having potential heart failure.  I am concerned that he potentially has a degree of diastolic heart failure and is on moderate dose of Lasix with continued edema.  I want to repeat an echocardiogram to evaluate.  We want to assess LV systolic and diastolic performance as well as filling pressures and pulmonary pressures.    2.  Empirically, I would like to stop ramipril and transition to Entresto.  I want him to call back in 1 week to see if he has any improvement of symptoms.  If in fact  that is the case, more convinced that this is diastolic heart failure.  We want to attempt to try this medication to see if he has clinical improvement.    3.  Patient's blood pressure slightly elevated but we are making adjustments to his regimen.  We will evaluate response.    4.  He is to hold ramipril today.  He has not taken yet and was instructed to take Entresto tomorrow.    5.  We will see him back for follow-up after echo.  He is not interested in stress testing as discussed with him today.  If symptoms worsen, want him to call the office.  Follow-up with primary as scheduled.         Patient did not bring med list or medicine bottles to appointment, med list has been reviewed and updated based on patient's knowledge of their meds.      Advance Care Planning  ACP discussion was declined by the patient. Patient has an advance directive (not in EMR), copy requested.      Electronically signed by:

## 2024-12-27 ENCOUNTER — HOSPITAL ENCOUNTER (OUTPATIENT)
Dept: CARDIOLOGY | Facility: HOSPITAL | Age: 74
Discharge: HOME | End: 2024-12-27
Payer: MEDICARE

## 2024-12-27 VITALS — HEIGHT: 70 IN | BODY MASS INDEX: 35.35 KG/M2 | WEIGHT: 246.91 LBS

## 2024-12-27 DIAGNOSIS — I10 PRIMARY HYPERTENSION: ICD-10-CM

## 2024-12-27 DIAGNOSIS — R06.02 SHORTNESS OF BREATH: ICD-10-CM

## 2024-12-27 DIAGNOSIS — R60.0 LOWER EXTREMITY EDEMA: ICD-10-CM

## 2024-12-27 LAB
AORTIC DIMENSIONLESS INDEX: 0.83 (DI)
AV MEAN PRESS GRAD SYS DOP V1V2: 2.9 MMHG
AV VMAX SYS DOP: 121.7 CM/SEC
BH CV ECHO MEAS - ACS: 1.63 CM
BH CV ECHO MEAS - AO MAX PG: 5.9 MMHG
BH CV ECHO MEAS - AO ROOT DIAM: 2.9 CM
BH CV ECHO MEAS - AO V2 VTI: 21.1 CM
BH CV ECHO MEAS - EDV(CUBED): 106.9 ML
BH CV ECHO MEAS - ESV(CUBED): 40.5 ML
BH CV ECHO MEAS - FS: 27.7 %
BH CV ECHO MEAS - IVS/LVPW: 0.82 CM
BH CV ECHO MEAS - IVSD: 0.92 CM
BH CV ECHO MEAS - LA DIMENSION: 3.6 CM
BH CV ECHO MEAS - LAT PEAK E' VEL: 6.9 CM/SEC
BH CV ECHO MEAS - LV MASS(C)D: 172.2 GRAMS
BH CV ECHO MEAS - LV MAX PG: 3.4 MMHG
BH CV ECHO MEAS - LV MEAN PG: 1.34 MMHG
BH CV ECHO MEAS - LV V1 MAX: 92.7 CM/SEC
BH CV ECHO MEAS - LV V1 VTI: 17.7 CM
BH CV ECHO MEAS - LVIDD: 4.7 CM
BH CV ECHO MEAS - LVIDS: 3.4 CM
BH CV ECHO MEAS - LVPWD: 1.12 CM
BH CV ECHO MEAS - MED PEAK E' VEL: 4.6 CM/SEC
BH CV ECHO MEAS - MV A MAX VEL: 73.8 CM/SEC
BH CV ECHO MEAS - MV DEC SLOPE: 394.7 CM/SEC2
BH CV ECHO MEAS - MV DEC TIME: 0.23 SEC
BH CV ECHO MEAS - MV E MAX VEL: 74.4 CM/SEC
BH CV ECHO MEAS - MV E/A: 1.01
BH CV ECHO MEAS - MV MAX PG: 3.4 MMHG
BH CV ECHO MEAS - MV MEAN PG: 1.76 MMHG
BH CV ECHO MEAS - MV P1/2T: 66.7 MSEC
BH CV ECHO MEAS - MV V2 VTI: 27.9 CM
BH CV ECHO MEAS - MVA(P1/2T): 3.3 CM2
BH CV ECHO MEAS - PA V2 MAX: 93.7 CM/SEC
BH CV ECHO MEAS - RAP SYSTOLE: 8 MMHG
BH CV ECHO MEAS - RV MAX PG: 2.6 MMHG
BH CV ECHO MEAS - RV V1 MAX: 79.9 CM/SEC
BH CV ECHO MEAS - RV V1 VTI: 16.7 CM
BH CV ECHO MEAS - RVDD: 3.4 CM
BH CV ECHO MEAS - RVSP: 11.1 MMHG
BH CV ECHO MEAS - TAPSE (>1.6): 2.42 CM
BH CV ECHO MEAS - TR MAX PG: 3.1 MMHG
BH CV ECHO MEAS - TR MAX VEL: 87.9 CM/SEC
BH CV ECHO MEASUREMENTS AVERAGE E/E' RATIO: 12.94
BH CV XLRA - TDI S': 10.8 CM/SEC
LV EF 2D ECHO EST: 54 %
SINUS: 3.2 CM

## 2024-12-27 PROCEDURE — 93306 TTE W/DOPPLER COMPLETE: CPT

## 2025-01-06 ENCOUNTER — TELEPHONE (OUTPATIENT)
Dept: CARDIOLOGY | Facility: CLINIC | Age: 75
End: 2025-01-06
Payer: MEDICARE

## 2025-01-06 DIAGNOSIS — I10 PRIMARY HYPERTENSION: ICD-10-CM

## 2025-01-06 DIAGNOSIS — R06.02 SHORTNESS OF BREATH: ICD-10-CM

## 2025-01-06 DIAGNOSIS — R60.0 LOWER EXTREMITY EDEMA: Primary | ICD-10-CM

## 2025-01-06 NOTE — TELEPHONE ENCOUNTER
Patient aware of recommendations.Willing to move forward with stress test.----- Message from Bello Oneill sent at 1/6/2025 10:18 AM EST -----  Let patient know that his systolic function is slightly decreased.  I would recommend him undergoing a stress test.  Also, is his edema and symptoms doing okay?

## 2025-01-23 ENCOUNTER — TELEPHONE (OUTPATIENT)
Dept: CARDIOLOGY | Facility: CLINIC | Age: 75
End: 2025-01-23
Payer: MEDICARE

## 2025-01-23 NOTE — TELEPHONE ENCOUNTER
Received cardiac clearance request from DR BENNY BERNSTEIN stating pt has CATARACT SURGERY scheduled for 02/20/2025 and is requiring a cardiac clearance. Placed cardiac clearance request in CHARO'S inbox to review and address with provider.

## 2025-01-23 NOTE — TELEPHONE ENCOUNTER
Caller: Shalom Bravo    Relationship: Self    Best call back number: [phone]     What is the best time to reach you: ASAP     Who are you requesting to speak with (clinical staff, provider,  specific staff member): CHRISTINA     Do you know the name of the person who called: N/A     What was the call regarding: STATES THIS IS ABOUT THE ENTRESTO, REQUESTING TO SPEAK WITH CHRISTINA ASAP     Is it okay if the provider responds through MyChart: CALL

## 2025-01-23 NOTE — TELEPHONE ENCOUNTER
Patient came in stating that he has not heard from financial assistance for Entresto 24-26 mg this was sent in Dec 2024. Financial assistance states they are behind. Patient states he bought 1 month he states that he can not pay that. Patient is asking if there is anything else cheaper. Patient would like someone to call him back with recommendations.Patient took his last one today.

## 2025-01-23 NOTE — TELEPHONE ENCOUNTER
SPOKE WITH NOVARTIS STATES MISSING SECOND PAGE OF [phone]. HE ADVISED WOULD BRING IT IN TOMORROW. HE BOUGHT ANOTHER BOTTLE OF ENTRESTO AS HE WAS DOING GOOD ON IT.

## 2025-06-06 ENCOUNTER — HOSPITAL ENCOUNTER (OUTPATIENT)
Dept: CARDIOLOGY | Facility: HOSPITAL | Age: 75
Discharge: HOME | End: 2025-06-06
Payer: MEDICARE

## 2025-06-06 DIAGNOSIS — I10 PRIMARY HYPERTENSION: ICD-10-CM

## 2025-06-06 DIAGNOSIS — R06.02 SHORTNESS OF BREATH: ICD-10-CM

## 2025-06-06 DIAGNOSIS — R60.0 LOWER EXTREMITY EDEMA: ICD-10-CM

## 2025-06-06 LAB
BH CV REST NUCLEAR ISOTOPE DOSE: 10 MCI
BH CV STRESS COMMENTS STAGE 1: NORMAL
BH CV STRESS DOSE REGADENOSON STAGE 1: 0.4
BH CV STRESS DURATION MIN STAGE 1: 0
BH CV STRESS DURATION SEC STAGE 1: 10
BH CV STRESS NUCLEAR ISOTOPE DOSE: 30 MCI
BH CV STRESS PROTOCOL 1: NORMAL
BH CV STRESS RECOVERY BP: NORMAL MMHG
BH CV STRESS RECOVERY HR: 84 BPM
BH CV STRESS STAGE 1: 1
MAXIMAL PREDICTED HEART RATE: 146 BPM
PERCENT MAX PREDICTED HR: 65.75 %
STRESS BASELINE BP: NORMAL MMHG
STRESS BASELINE HR: 70 BPM
STRESS PERCENT HR: 77 %
STRESS POST ESTIMATED WORKLOAD: 1 METS
STRESS POST EXERCISE DUR MIN: 2 MIN
STRESS POST PEAK BP: NORMAL MMHG
STRESS POST PEAK HR: 96 BPM
STRESS TARGET HR: 124 BPM

## 2025-06-06 PROCEDURE — 78452 HT MUSCLE IMAGE SPECT MULT: CPT

## 2025-06-06 PROCEDURE — 25010000002 REGADENOSON 0.4 MG/5ML SOLUTION: Performed by: INTERNAL MEDICINE

## 2025-06-06 PROCEDURE — A9500 TC99M SESTAMIBI: HCPCS | Performed by: INTERNAL MEDICINE

## 2025-06-06 PROCEDURE — 93017 CV STRESS TEST TRACING ONLY: CPT

## 2025-06-06 PROCEDURE — 34310000005 TECHNETIUM SESTAMIBI: Performed by: INTERNAL MEDICINE

## 2025-06-06 RX ORDER — REGADENOSON 0.08 MG/ML
0.4 INJECTION, SOLUTION INTRAVENOUS
Status: COMPLETED | OUTPATIENT
Start: 2025-06-06 | End: 2025-06-06

## 2025-06-06 RX ADMIN — TECHNETIUM TC 99M SESTAMIBI 1 DOSE: 1 INJECTION INTRAVENOUS at 13:57

## 2025-06-06 RX ADMIN — REGADENOSON 0.4 MG: 0.08 INJECTION, SOLUTION INTRAVENOUS at 13:57

## 2025-06-06 RX ADMIN — TECHNETIUM TC 99M SESTAMIBI 1 DOSE: 1 INJECTION INTRAVENOUS at 12:30

## 2025-06-26 ENCOUNTER — TELEPHONE (OUTPATIENT)
Dept: CARDIOLOGY | Facility: CLINIC | Age: 75
End: 2025-06-26

## 2025-06-26 NOTE — TELEPHONE ENCOUNTER
"  Caller: Shalom Bravo \"Shalom Bravo\"    Relationship to patient: Self    Best call back number: [phone]    Chief complaint:     Type of visit: FOLLOW UP    Requested date: BEFORE 7.3.2025     If rescheduling, when is the original appointment: 7.3.2025     Additional notes:PATIENT REQUESTING A SOONER APPT THAN 7.3.2025 HIS WIFE HAS AN APPT ONLY HAVE 1 CAR. PLEASE CALL HIM TO RES         "

## 2025-06-30 ENCOUNTER — OFFICE VISIT (OUTPATIENT)
Dept: CARDIOLOGY | Facility: CLINIC | Age: 75
End: 2025-06-30
Payer: MEDICARE

## 2025-06-30 VITALS
BODY MASS INDEX: 33.79 KG/M2 | HEART RATE: 70 BPM | HEIGHT: 70 IN | WEIGHT: 236 LBS | DIASTOLIC BLOOD PRESSURE: 63 MMHG | SYSTOLIC BLOOD PRESSURE: 104 MMHG

## 2025-06-30 DIAGNOSIS — R60.0 LOWER EXTREMITY EDEMA: ICD-10-CM

## 2025-06-30 DIAGNOSIS — R06.02 SHORTNESS OF BREATH: ICD-10-CM

## 2025-06-30 DIAGNOSIS — I10 PRIMARY HYPERTENSION: Primary | ICD-10-CM

## 2025-06-30 PROCEDURE — 3074F SYST BP LT 130 MM HG: CPT | Performed by: PHYSICIAN ASSISTANT

## 2025-06-30 PROCEDURE — 99213 OFFICE O/P EST LOW 20 MIN: CPT | Performed by: PHYSICIAN ASSISTANT

## 2025-06-30 PROCEDURE — 3078F DIAST BP <80 MM HG: CPT | Performed by: PHYSICIAN ASSISTANT

## 2025-06-30 NOTE — LETTER
June 30, 2025     Giacomo Hays PA-C  [address] [place] [id number]    Patient: Shalom Bravo   YOB: 1950   Date of Visit: 6/30/2025       Dear Giacomo Hays PA-C    Shalom Bravo was in my office today. Below is a copy of my note.    If you have questions, please do not hesitate to call me. I look forward to following Shalom along with you.         Sincerely,        SOMMER You        CC: No Recipients    Problem list     Subjective  Shalom Bravo is a 74 y.o. male     Chief Complaint   Patient presents with   • Testing follow up   Problem list  1.  History of nonobstructive coronaries approximately 8 to 10 years ago per patient report, inadequate data  1.1 stress test April 2022 demonstrating no evidence of ischemia preserved LV function  .  Stress test June 2025 with questionable inferolateral defect that did not meet criteria for ischemia.  Normal systolic function noted  2.  Chest pain  3.  Lower extremity edema  4.  Diastolic dysfunction  5.  Pulmonary fibrosis    HPI    Patient is a 74-year-old male that presents to the for evaluation and is doing remarkably well.  He has no chest pain or any shortness of breath at all.  Feels great.  He actually feels better than he has in a long time no complaints of PND or orthopnea.  No complaints of any edema.  Patient is quite functional and is being more proactive in regards to his health.    He does not describe palpitating nor does complain of dysrhythmic symptoms.  He feels stable otherwise.      Current Outpatient Medications on File Prior to Visit   Medication Sig Dispense Refill   • albuterol sulfate  (90 Base) MCG/ACT inhaler Inhale 2 puffs Every 4 (Four) Hours As Needed for Wheezing.     • carvedilol (COREG) 3.125 MG tablet Take 1 tablet by mouth 2 (Two) Times a Day With Meals.     • furosemide (LASIX) 40 MG tablet Take 1 tablet by mouth Daily.     • metFORMIN (GLUCOPHAGE) 500 MG tablet 1 tablet 2 (Two) Times a Day  With Meals.     • Multiple Vitamins-Minerals (VITEYES AREDS ADVANCED PO) Take  by mouth.     • potassium chloride ER (K-TAB) 20 MEQ tablet controlled-release ER tablet Take 1 tablet by mouth Daily.     • sacubitril-valsartan (ENTRESTO) 24-26 MG tablet Take 1 tablet by mouth 2 (Two) Times a Day. 60 tablet 5   • [DISCONTINUED] Guselkumab (Tremfya) 100 MG/ML solution auto-injector      • [DISCONTINUED] nitroglycerin (NITROSTAT) 0.4 MG SL tablet Place 1 tablet under the tongue Every 5 (Five) Minutes As Needed.       No current facility-administered medications on file prior to visit.       Latex    Past Medical History:   Diagnosis Date   • COPD (chronic obstructive pulmonary disease)    • Diabetes mellitus    • Hypertension    • Prostate cancer    • Psoriasis        Social History     Socioeconomic History   • Marital status:    Tobacco Use   • Smoking status: Former   • Smokeless tobacco: Current     Types: Snuff   Vaping Use   • Vaping status: Never Used   Substance and Sexual Activity   • Alcohol use: Yes   • Drug use: Not Currently   • Sexual activity: Defer       Family History   Problem Relation Age of Onset   • Alzheimer's disease Mother    • Leukemia Father    • Scleroderma Sister    • Heart attack Brother        Review of Systems   Constitutional:  Negative for activity change, appetite change, chills, fatigue and fever.   HENT: Negative.  Negative for congestion, sinus pressure and sinus pain.    Eyes: Negative.  Negative for visual disturbance.   Respiratory: Negative.  Negative for apnea, cough, chest tightness, shortness of breath and wheezing.    Cardiovascular: Negative.  Negative for chest pain, palpitations and leg swelling.   Gastrointestinal: Negative.  Negative for blood in stool.   Endocrine: Negative.  Negative for cold intolerance and heat intolerance.   Genitourinary: Negative.  Negative for hematuria.   Musculoskeletal: Negative.  Negative for gait problem.   Skin: Negative.  Negative  "for color change, rash and wound.   Allergic/Immunologic: Negative.  Negative for environmental allergies and food allergies.   Neurological:  Positive for dizziness. Negative for syncope, weakness, numbness and headaches.   Hematological:  Bruises/bleeds easily.   Psychiatric/Behavioral: Negative.  Negative for sleep disturbance.        Objective  Vitals:    06/30/25 1327   BP: 104/63   BP Location: Right arm   Patient Position: Sitting   Cuff Size: Adult   Pulse: 70   Weight: 107 kg (236 lb)   Height: 177.8 cm (70\")      /63 (BP Location: Right arm, Patient Position: Sitting, Cuff Size: Adult)   Pulse 70   Ht 177.8 cm (70\")   Wt 107 kg (236 lb)   BMI 33.86 kg/m²     Lab Results (most recent)       None            Physical Exam  Vitals and nursing note reviewed.   Constitutional:       General: He is not in acute distress.     Appearance: Normal appearance. He is well-developed.   HENT:      Head: Normocephalic and atraumatic.   Eyes:      General: No scleral icterus.        Right eye: No discharge.         Left eye: No discharge.      Conjunctiva/sclera: Conjunctivae normal.   Neck:      Vascular: No carotid bruit.   Cardiovascular:      Rate and Rhythm: Normal rate and regular rhythm.      Heart sounds: Normal heart sounds. No murmur heard.     No friction rub. No gallop.   Pulmonary:      Effort: Pulmonary effort is normal. No respiratory distress.      Breath sounds: Normal breath sounds. No wheezing or rales.   Chest:      Chest wall: No tenderness.   Musculoskeletal:      Right lower leg: No edema.      Left lower leg: No edema.   Skin:     General: Skin is warm and dry.      Coloration: Skin is not pale.      Findings: No erythema or rash.   Neurological:      Mental Status: He is alert and oriented to person, place, and time.      Cranial Nerves: No cranial nerve deficit.   Psychiatric:         Behavior: Behavior normal.         Procedure  Procedures       Assessment & Plan    Problems Addressed " this Visit          Cardiac and Vasculature    Primary hypertension - Primary       Symptoms and Signs    Dyspnea    Lower extremity edema     Diagnoses         Codes Comments      Primary hypertension    -  Primary ICD-10-CM: I10  ICD-9-CM: 401.9       Lower extremity edema     ICD-10-CM: R60.0  ICD-9-CM: 782.3       Shortness of breath     ICD-10-CM: R06.02  ICD-9-CM: 786.05           Recommendations  1.  Patient is a 74-year-old male presenting for evaluation.  Clinically, he has improved.  Dyspnea has resolved.  He has normal functional status if not above average functional status.  For now, stress test does not demonstrate any diagnostic evidence of ischemia.  In the absence of symptoms with findings on testing, we can continue to treat medically.    2.  I will continue Entresto.  It was started due to diastolic dysfunction and questionable mild systolic dysfunction by echo but this appears to be normal by stress test.  He feels well.  I will make no changes.    3.  Lower extremity edema appears to be resolved if not controlled.  Patient is on diuretic therapy and Entresto.  We will continue at this time.    4.  Overall, patient is doing well we can see him back for follow-up in 6 months to a year.  I do recommend statin therapy because of patient's diabetic state.  Patient's labs are monitored by primary.    5.  Can see him back in 6 months to year.  Follow-up with primary as scheduled.             Shalom Bravo  reports that he has quit smoking. His smokeless tobacco use includes snuff.     Patient did not bring med list or medicine bottles to appointment, med list has been reviewed and updated based on patient's knowledge of their meds.      Advance Care Planning  ACP discussion was declined by the patient. Patient has an advance directive (not in EMR), copy requested.        Electronically signed by:

## 2025-06-30 NOTE — PROGRESS NOTES
Problem list     Subjective   Shalom Bravo is a 74 y.o. male     Chief Complaint   Patient presents with    Testing follow up   Problem list  1.  History of nonobstructive coronaries approximately 8 to 10 years ago per patient report, inadequate data  1.1 stress test April 2022 demonstrating no evidence of ischemia preserved LV function  .  Stress test June 2025 with questionable inferolateral defect that did not meet criteria for ischemia.  Normal systolic function noted  2.  Chest pain  3.  Lower extremity edema  4.  Diastolic dysfunction  5.  Pulmonary fibrosis    HPI    Patient is a 74-year-old male that presents to the for evaluation and is doing remarkably well.  He has no chest pain or any shortness of breath at all.  Feels great.  He actually feels better than he has in a long time no complaints of PND or orthopnea.  No complaints of any edema.  Patient is quite functional and is being more proactive in regards to his health.    He does not describe palpitating nor does complain of dysrhythmic symptoms.  He feels stable otherwise.      Current Outpatient Medications on File Prior to Visit   Medication Sig Dispense Refill    albuterol sulfate  (90 Base) MCG/ACT inhaler Inhale 2 puffs Every 4 (Four) Hours As Needed for Wheezing.      carvedilol (COREG) 3.125 MG tablet Take 1 tablet by mouth 2 (Two) Times a Day With Meals.      furosemide (LASIX) 40 MG tablet Take 1 tablet by mouth Daily.      metFORMIN (GLUCOPHAGE) 500 MG tablet 1 tablet 2 (Two) Times a Day With Meals.      Multiple Vitamins-Minerals (VITEYES AREDS ADVANCED PO) Take  by mouth.      potassium chloride ER (K-TAB) 20 MEQ tablet controlled-release ER tablet Take 1 tablet by mouth Daily.      sacubitril-valsartan (ENTRESTO) 24-26 MG tablet Take 1 tablet by mouth 2 (Two) Times a Day. 60 tablet 5    [DISCONTINUED] Guselkumab (Tremfya) 100 MG/ML solution auto-injector       [DISCONTINUED] nitroglycerin (NITROSTAT) 0.4 MG SL tablet Place 1  tablet under the tongue Every 5 (Five) Minutes As Needed.       No current facility-administered medications on file prior to visit.       Latex    Past Medical History:   Diagnosis Date    COPD (chronic obstructive pulmonary disease)     Diabetes mellitus     Hypertension     Prostate cancer     Psoriasis        Social History     Socioeconomic History    Marital status:    Tobacco Use    Smoking status: Former    Smokeless tobacco: Current     Types: Snuff   Vaping Use    Vaping status: Never Used   Substance and Sexual Activity    Alcohol use: Yes    Drug use: Not Currently    Sexual activity: Defer       Family History   Problem Relation Age of Onset    Alzheimer's disease Mother     Leukemia Father     Scleroderma Sister     Heart attack Brother        Review of Systems   Constitutional:  Negative for activity change, appetite change, chills, fatigue and fever.   HENT: Negative.  Negative for congestion, sinus pressure and sinus pain.    Eyes: Negative.  Negative for visual disturbance.   Respiratory: Negative.  Negative for apnea, cough, chest tightness, shortness of breath and wheezing.    Cardiovascular: Negative.  Negative for chest pain, palpitations and leg swelling.   Gastrointestinal: Negative.  Negative for blood in stool.   Endocrine: Negative.  Negative for cold intolerance and heat intolerance.   Genitourinary: Negative.  Negative for hematuria.   Musculoskeletal: Negative.  Negative for gait problem.   Skin: Negative.  Negative for color change, rash and wound.   Allergic/Immunologic: Negative.  Negative for environmental allergies and food allergies.   Neurological:  Positive for dizziness. Negative for syncope, weakness, numbness and headaches.   Hematological:  Bruises/bleeds easily.   Psychiatric/Behavioral: Negative.  Negative for sleep disturbance.        Objective   Vitals:    06/30/25 1327   BP: 104/63   BP Location: Right arm   Patient Position: Sitting   Cuff Size: Adult   Pulse:  "70   Weight: 107 kg (236 lb)   Height: 177.8 cm (70\")      /63 (BP Location: Right arm, Patient Position: Sitting, Cuff Size: Adult)   Pulse 70   Ht 177.8 cm (70\")   Wt 107 kg (236 lb)   BMI 33.86 kg/m²     Lab Results (most recent)       None            Physical Exam  Vitals and nursing note reviewed.   Constitutional:       General: He is not in acute distress.     Appearance: Normal appearance. He is well-developed.   HENT:      Head: Normocephalic and atraumatic.   Eyes:      General: No scleral icterus.        Right eye: No discharge.         Left eye: No discharge.      Conjunctiva/sclera: Conjunctivae normal.   Neck:      Vascular: No carotid bruit.   Cardiovascular:      Rate and Rhythm: Normal rate and regular rhythm.      Heart sounds: Normal heart sounds. No murmur heard.     No friction rub. No gallop.   Pulmonary:      Effort: Pulmonary effort is normal. No respiratory distress.      Breath sounds: Normal breath sounds. No wheezing or rales.   Chest:      Chest wall: No tenderness.   Musculoskeletal:      Right lower leg: No edema.      Left lower leg: No edema.   Skin:     General: Skin is warm and dry.      Coloration: Skin is not pale.      Findings: No erythema or rash.   Neurological:      Mental Status: He is alert and oriented to person, place, and time.      Cranial Nerves: No cranial nerve deficit.   Psychiatric:         Behavior: Behavior normal.         Procedure   Procedures       Assessment & Plan     Problems Addressed this Visit          Cardiac and Vasculature    Primary hypertension - Primary       Symptoms and Signs    Dyspnea    Lower extremity edema     Diagnoses         Codes Comments      Primary hypertension    -  Primary ICD-10-CM: I10  ICD-9-CM: 401.9       Lower extremity edema     ICD-10-CM: R60.0  ICD-9-CM: 782.3       Shortness of breath     ICD-10-CM: R06.02  ICD-9-CM: 786.05           Recommendations  1.  Patient is a 74-year-old male presenting for evaluation.  " Clinically, he has improved.  Dyspnea has resolved.  He has normal functional status if not above average functional status.  For now, stress test does not demonstrate any diagnostic evidence of ischemia.  In the absence of symptoms with findings on testing, we can continue to treat medically.    2.  I will continue Entresto.  It was started due to diastolic dysfunction and questionable mild systolic dysfunction by echo but this appears to be normal by stress test.  He feels well.  I will make no changes.    3.  Lower extremity edema appears to be resolved if not controlled.  Patient is on diuretic therapy and Entresto.  We will continue at this time.    4.  Overall, patient is doing well we can see him back for follow-up in 6 months to a year.  I do recommend statin therapy because of patient's diabetic state.  Patient's labs are monitored by primary.    5.  Can see him back in 6 months to year.  Follow-up with primary as scheduled.             Shalom Bravo  reports that he has quit smoking. His smokeless tobacco use includes snuff.     Patient did not bring med list or medicine bottles to appointment, med list has been reviewed and updated based on patient's knowledge of their meds.      Advance Care Planning   ACP discussion was declined by the patient. Patient has an advance directive (not in EMR), copy requested.        Electronically signed by:

## 2025-07-10 ENCOUNTER — TELEPHONE (OUTPATIENT)
Dept: CARDIOLOGY | Facility: CLINIC | Age: 75
End: 2025-07-10
Payer: MEDICARE

## 2025-07-10 NOTE — TELEPHONE ENCOUNTER
Received cardiac clearance request from DR LACEY SHEETS stating pt has COLONOSCOPY scheduled for TBD and is requiring a cardiac clearance. Placed cardiac clearance request in CHARO'S inbox to review and address with provider.

## 2025-08-26 ENCOUNTER — TELEPHONE (OUTPATIENT)
Dept: CARDIOLOGY | Facility: CLINIC | Age: 75
End: 2025-08-26
Payer: MEDICARE

## 2025-08-26 RX ORDER — SACUBITRIL AND VALSARTAN 24; 26 MG/1; MG/1
1 TABLET ORAL 2 TIMES DAILY
Qty: 60 TABLET | Refills: 5 | Status: SHIPPED | OUTPATIENT
Start: 2025-08-26